# Patient Record
Sex: FEMALE | Race: WHITE | NOT HISPANIC OR LATINO | URBAN - NONMETROPOLITAN AREA
[De-identification: names, ages, dates, MRNs, and addresses within clinical notes are randomized per-mention and may not be internally consistent; named-entity substitution may affect disease eponyms.]

---

## 2022-05-19 ENCOUNTER — APPOINTMENT (OUTPATIENT)
Dept: GENERAL RADIOLOGY | Facility: HOSPITAL | Age: 52
End: 2022-05-19

## 2022-05-19 ENCOUNTER — HOSPITAL ENCOUNTER (EMERGENCY)
Facility: HOSPITAL | Age: 52
Discharge: HOME | End: 2022-05-20
Attending: EMERGENCY MEDICINE | Admitting: EMERGENCY MEDICINE

## 2022-05-19 VITALS
SYSTOLIC BLOOD PRESSURE: 173 MMHG | TEMPERATURE: 97.6 F | DIASTOLIC BLOOD PRESSURE: 85 MMHG | HEIGHT: 61 IN | BODY MASS INDEX: 34.36 KG/M2 | WEIGHT: 182 LBS | OXYGEN SATURATION: 99 % | RESPIRATION RATE: 16 BRPM | HEART RATE: 65 BPM

## 2022-05-19 DIAGNOSIS — M25.511 ACUTE PAIN OF RIGHT SHOULDER: Primary | ICD-10-CM

## 2022-05-19 PROCEDURE — 99283 EMERGENCY DEPT VISIT LOW MDM: CPT

## 2022-05-19 PROCEDURE — 73030 X-RAY EXAM OF SHOULDER: CPT

## 2022-05-19 RX ORDER — LISINOPRIL 40 MG/1
40 TABLET ORAL DAILY
COMMUNITY

## 2022-05-19 RX ORDER — KETOROLAC TROMETHAMINE 30 MG/ML
30 INJECTION, SOLUTION INTRAMUSCULAR; INTRAVENOUS EVERY 6 HOURS PRN
Status: DISCONTINUED | OUTPATIENT
Start: 2022-05-19 | End: 2022-05-20

## 2022-05-19 RX ORDER — FAMOTIDINE 20 MG/1
20 TABLET, FILM COATED ORAL 2 TIMES DAILY
COMMUNITY

## 2022-05-19 RX ORDER — ATORVASTATIN CALCIUM 20 MG/1
20 TABLET, FILM COATED ORAL DAILY
COMMUNITY

## 2022-05-19 RX ORDER — LORATADINE 10 MG/1
10 TABLET ORAL DAILY
COMMUNITY

## 2022-05-19 RX ORDER — LEVOTHYROXINE SODIUM 0.15 MG/1
150 TABLET ORAL DAILY
COMMUNITY

## 2022-05-19 RX ORDER — NAPROXEN 250 MG/1
250 TABLET ORAL 2 TIMES DAILY PRN
COMMUNITY

## 2022-05-19 RX ORDER — SERTRALINE HYDROCHLORIDE 100 MG/1
100 TABLET, FILM COATED ORAL 2 TIMES DAILY
COMMUNITY

## 2022-05-20 PROCEDURE — 25010000002 KETOROLAC TROMETHAMINE PER 15 MG: Performed by: EMERGENCY MEDICINE

## 2022-05-20 PROCEDURE — 96372 THER/PROPH/DIAG INJ SC/IM: CPT

## 2022-05-20 RX ORDER — CYCLOBENZAPRINE HCL 5 MG
5 TABLET ORAL 3 TIMES DAILY PRN
Qty: 10 TABLET | Refills: 0 | Status: SHIPPED | OUTPATIENT
Start: 2022-05-20

## 2022-05-20 RX ORDER — KETOROLAC TROMETHAMINE 30 MG/ML
30 INJECTION, SOLUTION INTRAMUSCULAR; INTRAVENOUS ONCE
Status: COMPLETED | OUTPATIENT
Start: 2022-05-20 | End: 2022-05-20

## 2022-05-20 RX ADMIN — KETOROLAC TROMETHAMINE 30 MG: 30 INJECTION, SOLUTION INTRAMUSCULAR at 00:13

## 2022-05-20 NOTE — ED PROVIDER NOTES
Subjective   Chief Complaint: Right shoulder pain    History of Present Illness: This is a 52-year-old female patient comes into the ED complaining of right shoulder pain.  Patient states she was at work handling a large tray of food when it shifted causing her to alter her balance and put a strain on her right shoulder.  Patient states she has a history of ligament injury in the left shoulder and pain feels similar to past episode.  Patient states she is unable to move her right arm without pain.    Nurses Notes reviewed and agree, including vitals, allergies, social history and prior medical history.                Review of Systems   Musculoskeletal: Positive for joint swelling and myalgias.   All other systems reviewed and are negative.      Past Medical History:   Diagnosis Date   • Diabetes mellitus (HCC)    • Hypertension    • Psoriatic arthritis (HCC)    • Thyroid activity decreased        Allergies   Allergen Reactions   • Penicillins Hives   • Sulfa Antibiotics Hives       Past Surgical History:   Procedure Laterality Date   • CARPAL TUNNEL RELEASE      LUPIS.   •  SECTION     • REFRACTIVE SURGERY     • SHOULDER ARTHROSCOPY W/ ROTATOR CUFF REPAIR Left        History reviewed. No pertinent family history.    Social History     Socioeconomic History   • Marital status:    Tobacco Use   • Smoking status: Never Smoker   • Smokeless tobacco: Never Used   Substance and Sexual Activity   • Alcohol use: Not Currently   • Drug use: Never           Objective   Physical Exam  Vitals and nursing note reviewed.   Constitutional:       Appearance: Normal appearance.   HENT:      Head: Normocephalic and atraumatic.   Eyes:      Extraocular Movements: Extraocular movements intact.      Pupils: Pupils are equal, round, and reactive to light.   Cardiovascular:      Rate and Rhythm: Normal rate and regular rhythm.      Pulses: Normal pulses.      Heart sounds: Normal heart sounds.   Pulmonary:      Effort:  Pulmonary effort is normal.      Breath sounds: Normal breath sounds.   Abdominal:      General: Abdomen is flat. Bowel sounds are normal.      Palpations: Abdomen is soft.   Musculoskeletal:      Cervical back: Normal range of motion and neck supple.      Comments: Moderate tenderness to palpation over humeral head, pain with passive range of motion worse with lowering of arm   Skin:     Capillary Refill: Capillary refill takes less than 2 seconds.   Neurological:      General: No focal deficit present.      Mental Status: She is alert and oriented to person, place, and time. Mental status is at baseline.      GCS: GCS eye subscore is 4. GCS verbal subscore is 5. GCS motor subscore is 6.      Sensory: Sensation is intact.      Motor: Motor function is intact.      Gait: Gait is intact.   Psychiatric:         Attention and Perception: Attention and perception normal.         Mood and Affect: Mood and affect normal.         Speech: Speech normal.         Behavior: Behavior normal. Behavior is cooperative.         Procedures           ED Course                                                 MDM    Final diagnoses:   Acute pain of right shoulder       ED Disposition  ED Disposition     ED Disposition   Discharge    Condition   Stable    Comment   --             Leander Khan MD  [address] [facility] [id number] [phone]    In 1 week  Follow-up    Yash Brush MD  [address] [facility]  [address]  [facility] [address]  [phone]    In 1 day  Follow-up         Medication List      New Prescriptions    cyclobenzaprine 5 MG tablet  Commonly known as: FLEXERIL  Take 1 tablet by mouth 3 (Three) Times a Day As Needed for Muscle Spasms for up to 10 doses.           Where to Get Your Medications      [facility] - [address] AT [facility] & (JIM - [phone] [facility] [phone] FX  [address] [facility] [address]    Phone:  [phone]   · cyclobenzaprine 5 MG tablet          José Ochoa, APRN  05/20/22 0009

## 2022-06-08 ENCOUNTER — TRANSCRIBE ORDERS (OUTPATIENT)
Dept: ADMINISTRATIVE | Facility: HOSPITAL | Age: 52
End: 2022-06-08

## 2022-06-08 DIAGNOSIS — H44.609: Primary | ICD-10-CM

## 2022-06-09 ENCOUNTER — HOSPITAL ENCOUNTER (OUTPATIENT)
Dept: GENERAL RADIOLOGY | Facility: HOSPITAL | Age: 52
Discharge: HOME | End: 2022-06-09
Admitting: RADIOLOGY

## 2022-06-09 PROCEDURE — 70140 X-RAY EXAM OF FACIAL BONES: CPT

## 2024-11-23 ENCOUNTER — APPOINTMENT (OUTPATIENT)
Dept: CT IMAGING | Facility: HOSPITAL | Age: 54
End: 2024-11-23
Payer: OTHER MISCELLANEOUS

## 2024-11-23 ENCOUNTER — HOSPITAL ENCOUNTER (EMERGENCY)
Facility: HOSPITAL | Age: 54
Discharge: HOME | End: 2024-11-23
Attending: STUDENT IN AN ORGANIZED HEALTH CARE EDUCATION/TRAINING PROGRAM
Payer: OTHER MISCELLANEOUS

## 2024-11-23 VITALS
OXYGEN SATURATION: 96 % | WEIGHT: 168 LBS | DIASTOLIC BLOOD PRESSURE: 73 MMHG | SYSTOLIC BLOOD PRESSURE: 145 MMHG | HEIGHT: 61 IN | TEMPERATURE: 98 F | BODY MASS INDEX: 31.72 KG/M2 | RESPIRATION RATE: 17 BRPM | HEART RATE: 65 BPM

## 2024-11-23 DIAGNOSIS — S09.90XA INJURY OF HEAD, INITIAL ENCOUNTER: Primary | ICD-10-CM

## 2024-11-23 DIAGNOSIS — S19.9XXA INJURY OF NECK, INITIAL ENCOUNTER: ICD-10-CM

## 2024-11-23 PROCEDURE — 70450 CT HEAD/BRAIN W/O DYE: CPT

## 2024-11-23 PROCEDURE — 63710000001 ONDANSETRON ODT 4 MG TABLET DISPERSIBLE: Performed by: PHYSICIAN ASSISTANT

## 2024-11-23 PROCEDURE — 72125 CT NECK SPINE W/O DYE: CPT

## 2024-11-23 PROCEDURE — 99284 EMERGENCY DEPT VISIT MOD MDM: CPT | Performed by: STUDENT IN AN ORGANIZED HEALTH CARE EDUCATION/TRAINING PROGRAM

## 2024-11-23 RX ORDER — ONDANSETRON 4 MG/1
4 TABLET, ORALLY DISINTEGRATING ORAL ONCE
Status: COMPLETED | OUTPATIENT
Start: 2024-11-23 | End: 2024-11-23

## 2024-11-23 RX ORDER — ONDANSETRON 4 MG/1
4 TABLET, ORALLY DISINTEGRATING ORAL EVERY 6 HOURS PRN
Qty: 20 TABLET | Refills: 0 | Status: SHIPPED | OUTPATIENT
Start: 2024-11-23

## 2024-11-23 RX ORDER — CYCLOBENZAPRINE HCL 10 MG
10 TABLET ORAL 3 TIMES DAILY PRN
Qty: 21 TABLET | Refills: 0 | Status: SHIPPED | OUTPATIENT
Start: 2024-11-23

## 2024-11-23 RX ADMIN — ONDANSETRON 4 MG: 4 TABLET, ORALLY DISINTEGRATING ORAL at 17:07

## 2024-11-23 NOTE — ED NOTES
Lab coming to get pt for post accident drug screen. Workman's comp form filled out and given to pt.

## 2024-11-23 NOTE — ED PROVIDER NOTES
EMERGENCY DEPARTMENT ENCOUNTER    Pt Name: Kate Ortiz  MRN: [id number]  Pt :   1970  Room Number:  24SF/24  Date of encounter:  2024  PCP: Brigitte Suarez MD  ED Provider: Otilio Alvarado PA-C    Historian: Patient      HPI:  Chief Complaint   Patient presents with    Head Injury          Context: Kate Ortiz is a 54 y.o. female who presents to the ED c/o struck in head with 35 pound box that fell 4 to 5 feet from above her head.  This happened at 1515 today.  No use of anticoagulants.  No LOC.  Has had some blurry vision and nausea.  Also complains of some midline neck pain.  States her head was not forced into flexion or extension but more so just straight downward.  Has tried Tylenol with minimal relief.  This is a Worker's Comp. injury.      PAST MEDICAL HISTORY  Past Medical History:   Diagnosis Date    Diabetes mellitus     Hypertension     Psoriatic arthritis     Thyroid activity decreased          PAST SURGICAL HISTORY  Past Surgical History:   Procedure Laterality Date    CARPAL TUNNEL RELEASE      LUPIS.     SECTION      REFRACTIVE SURGERY      SHOULDER ARTHROSCOPY W/ ROTATOR CUFF REPAIR Left          FAMILY HISTORY  History reviewed. No pertinent family history.      SOCIAL HISTORY  Social History     Socioeconomic History    Marital status:    Tobacco Use    Smoking status: Never    Smokeless tobacco: Never   Substance and Sexual Activity    Alcohol use: Not Currently    Drug use: Never         ALLERGIES  Penicillins and Sulfa antibiotics        REVIEW OF SYSTEMS  Review of Systems   Constitutional: Negative.    Eyes: Negative.    Respiratory: Negative.     Cardiovascular: Negative.    Gastrointestinal: Negative.    Genitourinary: Negative.    Musculoskeletal:  Positive for neck pain.   Skin: Negative.    Neurological:  Positive for headaches.   Psychiatric/Behavioral: Negative.          All systems reviewed and negative except for those discussed in HPI.        PHYSICAL EXAM    I have reviewed the triage vital signs and nursing notes.    ED Triage Vitals [11/23/24 1641]   Temp Heart Rate Resp BP SpO2   98 °F (36.7 °C) 65 17 145/73 96 %      Temp src Heart Rate Source Patient Position BP Location FiO2 (%)   Oral Monitor Sitting Left arm --       Physical Exam  Vitals and nursing note reviewed.   Constitutional:       General: She is not in acute distress.     Appearance: Normal appearance. She is obese. She is not ill-appearing, toxic-appearing or diaphoretic.   HENT:      Head: Normocephalic and atraumatic.      Comments: No palpable scalp hematomas, no lacerations.  Negative Delarosa sign, negative raccoon eyes.     Nose: Nose normal.      Mouth/Throat:      Mouth: Mucous membranes are moist.   Eyes:      Extraocular Movements: Extraocular movements intact.   Neck:      Comments: Mild midline vertebral tenderness.  No step-offs or deformities of the cervical spine  Cardiovascular:      Rate and Rhythm: Normal rate.   Pulmonary:      Effort: Pulmonary effort is normal.   Abdominal:      General: Abdomen is flat.   Musculoskeletal:         General: Normal range of motion.   Skin:     General: Skin is warm and dry.   Neurological:      General: No focal deficit present.      Mental Status: She is alert. Mental status is at baseline.      Gait: Gait normal.   Psychiatric:         Mood and Affect: Mood normal.         Behavior: Behavior normal.            LAB RESULTS  No results found for this or any previous visit (from the past 24 hours).    If labs were ordered, I independently reviewed the results and considered them in treating the patient.        RADIOLOGY  CT Cervical Spine Without Contrast    Result Date: 11/23/2024  FINAL REPORT TECHNIQUE: null CLINICAL HISTORY: neck pain, struck in head COMPARISON: null FINDINGS: CT cervical spine without contrast. COMPARISON: None FINDINGS: Straightening of the normal cervical lordosis. Vertebral body heights are maintained.  Calcified plaque present at the carotid bulb on the left. Visualized intracranial structures are unremarkable. C2-C3: No significant neuroforaminal narrowing or spinal canal stenosis. C3-C4: Uncovertebral joint hypertrophy. Posterior disc osteophyte complex. No significant neural foraminal narrowing. C4-C5: No significant neuroforaminal narrowing or spinal canal stenosis. C5-C6: Anterior marginal osteophytes. No significant neural foraminal narrowing. C6-C7: Anterior marginal osteophytes. No significant neural foraminal narrowing.     IMPRESSION: 1. No evidence of acute injury to the cervical spine. 2. Straightening of the normal cervical lordosis, likely positional. Authenticated and Electronically Signed by Srikanth Cooper MD on 11/23/2024 06:05:46 PM    CT Head Without Contrast    Result Date: 11/23/2024  FINAL REPORT TECHNIQUE: null CLINICAL HISTORY: head injury COMPARISON: null FINDINGS: CT head without contrast. COMPARISON: None FINDINGS: The visualized paranasal sinuses are clear. The mastoid air cells are clear. No calvarial fracture. No evidence for mass or mass effect. No intracranial hemorrhage or abnormal extra-axial fluid collection. No evidence of hydrocephalus. The basilar cisterns are patent. Posterior fossa appears unremarkable.     IMPRESSION: 1. No acute intracranial findings. Authenticated and Electronically Signed by Srikanth Cooper MD on 11/23/2024 06:05:11 PM         PROCEDURES    Procedures    Interpretations    O2 Sat: The patients oxygen saturation was 96% on Room Air.  This was independently interpreted by me as Normal    Radiology: I ordered and independently reviewed the above noted radiographic studies.  I viewed images of CT Head which showed No intracranial hemorrhage per my independent interpretation. See radiologist's dictation for official interpretation.     MEDICATIONS GIVEN IN ER    Medications   ondansetron ODT (ZOFRAN-ODT) disintegrating tablet 4 mg (4 mg Oral Given 11/23/24 4867)          MEDICAL DECISION MAKING, PROGRESS, and CONSULTS    All labs, if obtained, have been independently reviewed by me.  All radiology studies, if obtained, have been reviewed by me and the radiologist dictating the report.  All EKG's, if obtained, have been independently viewed and interpreted by me      Discussion below represents my analysis of pertinent findings related to patient's condition, differential diagnosis, treatment plan and final disposition.      Differential diagnosis:    Skull fracture, scalp hematoma, cervical fracture, cervical strain    Additional Sources:  None      Orders placed during this visit:  Orders Placed This Encounter   Procedures    CT Head Without Contrast    CT Cervical Spine Without Contrast         Additional orders considered but not ordered:  None    ED Course:    Consultants:  None    ED Course as of 11/23/24 1816   Sat Nov 23, 2024 1810 CT scan of head and C-spine per radiologist without acute findings.  Will give pain control and Zofran with outpatient follow-up instructions for strict turn to care precautions. [TM]      ED Course User Index  [TM] Otilio Alvarado PA-C           After my consideration of clinical presentation and any laboratory/radiology studies obtained, I discussed the findings with the patient/patient representative who is in agreement with the treatment plan and the final disposition. Risks and benefits of discharge were discussed.     AS OF 18:16 EST VITALS:    BP - 145/73  HR - 65  TEMP - 98 °F (36.7 °C) (Oral)  O2 SATS - 96%    I reviewed the patients prescription monitoring report if available prior to discharge    DIAGNOSIS  Final diagnoses:   Injury of head, initial encounter   Injury of neck, initial encounter         DISPOSITION  ED Disposition       ED Disposition   Discharge    Condition   Stable    Comment   --                   Please note that portions of this document were completed with voice recognition software.         Otilio Alvarado PA-C  11/23/24 1811

## 2024-11-23 NOTE — Clinical Note
[facility] EMERGENCY DEPARTMENT  [address] [facility] [address]  Phone: [phone]    Kate Ortiz was seen and treated in our emergency department on 11/23/2024.  She may return to work on 11/26/2024.         Thank you for choosing [facility].    Otilio Alvarado PA-C

## 2024-11-23 NOTE — Clinical Note
[facility] EMERGENCY DEPARTMENT  [address] [facility] [address]  Phone: [phone]    Kate Ortiz was seen and treated in our emergency department on 11/23/2024.  She may return to work on 11/26/2024.         Thank you for choosing [facility].    Otilio Alvarado PA-C